# Patient Record
Sex: MALE | Race: WHITE | URBAN - METROPOLITAN AREA
[De-identification: names, ages, dates, MRNs, and addresses within clinical notes are randomized per-mention and may not be internally consistent; named-entity substitution may affect disease eponyms.]

---

## 2020-01-02 ENCOUNTER — APPOINTMENT (OUTPATIENT)
Dept: URBAN - METROPOLITAN AREA SURGERY 18 | Age: 80
Setting detail: DERMATOLOGY
End: 2020-01-04

## 2020-01-02 VITALS — DIASTOLIC BLOOD PRESSURE: 77 MMHG | HEART RATE: 51 BPM | SYSTOLIC BLOOD PRESSURE: 129 MMHG

## 2020-01-02 DIAGNOSIS — D485 NEOPLASM OF UNCERTAIN BEHAVIOR OF SKIN: ICD-10-CM

## 2020-01-02 PROBLEM — D48.5 NEOPLASM OF UNCERTAIN BEHAVIOR OF SKIN: Status: ACTIVE | Noted: 2020-01-02

## 2020-01-02 PROCEDURE — 11102 TANGNTL BX SKIN SINGLE LES: CPT

## 2020-01-02 PROCEDURE — OTHER BIOPSY BY SHAVE METHOD: OTHER

## 2020-01-02 PROCEDURE — OTHER PATIENT SPECIFIC COUNSELING: OTHER

## 2020-01-02 ASSESSMENT — LOCATION DETAILED DESCRIPTION DERM: LOCATION DETAILED: RIGHT POSTERIOR PARIETAL SCALP

## 2020-01-02 ASSESSMENT — LOCATION SIMPLE DESCRIPTION DERM: LOCATION SIMPLE: POSTERIOR SCALP

## 2020-01-02 ASSESSMENT — LOCATION ZONE DERM: LOCATION ZONE: SCALP

## 2020-01-02 NOTE — PROCEDURE: BIOPSY BY SHAVE METHOD
Path Notes (To The Dermatopathologist): * Previously biopsied lesion (11/27/2019) (Specimen number: [id number]) - diagnosis of SEVERELY ATYPICAL INTRAEPIDERMAL\\nMELANOCYTIC PROLIFERATION (concern but diagnosis not sufficient for MIS) - rebiopsy to establish definitive diagnosis. \\n\\n* Please review old slides (+/- tissue) from previous biopsy (lab information for previous biopsy sent along with new/repeat biopsy)\\n\\n** Patient had biopsy of separate lesion (located at least 2.5 cm from lesion above) on 12/04/2019 which returned diagnosis of 0.2 mm depth invasive melanoma. Path Notes (To The Dermatopathologist): * Previously biopsied lesion (11/27/2019) (Specimen number: [id number]) - diagnosis of SEVERELY ATYPICAL INTRAEPIDERMAL\\nMELANOCYTIC PROLIFERATION (concern but diagnosis not sufficient for MIS) - rebiopsy to establish definitive diagnosis. \\n\\n* Please review old slides (+/- tissue) from previous biopsy (lab information for previous biopsy sent along with new/repeat biopsy)\\n\\n** Patient had biopsy of separate lesion (located at least 2.5 cm from lesion above) on 12/04/2019 which returned diagnosis of 0.2 mm depth invasive melanoma.

## 2020-01-02 NOTE — PROCEDURE: PATIENT SPECIFIC COUNSELING
Detail Level: Zone
* Prior to the biopsy we discussed both diagnoses at length, as well as the prognosis of each lesion and options for management. \\n\\n* We discussed the indeterminate nature of the \"severely atypical melanocytic process\" the possible definitive diagnoses - benign (severely atypical junctional nevus +/- association with adjacent invasive melanoma), malignant (melanoma in situ - either additional primary or associated with adjacent invasive melanoma as one larger lesion). \\n\\n* We discussed the importance of attempting to establish definitive diagnosis in order to most precisely prognosticate and determine the most appropriate course of treatment (WLE with narrow margin vs 0.5 cm vs 1.0 cm margins or margin controlled therapy). The patient verbalized understanding and agreement with plan. He understands the limitations of additional biopsy (prior biopsy may have removed most diagnostic portion of lesion), and understands the rationale behind additional dermatopathology review of previous biopsy. All questions and concerns were addressed. \\n\\n* He will return next week for Mohs surgery for treatment of the invasive melanoma on the scalp. He understands we may end up treating the AIMP as the surgery for the invasive MM may run into the adjacent AIMP given the lesions' close proximity to one another.

## 2020-01-17 ENCOUNTER — APPOINTMENT (OUTPATIENT)
Dept: URBAN - METROPOLITAN AREA SURGERY 18 | Age: 80
Setting detail: DERMATOLOGY
End: 2020-01-18

## 2020-01-17 VITALS — HEART RATE: 66 BPM | DIASTOLIC BLOOD PRESSURE: 65 MMHG | SYSTOLIC BLOOD PRESSURE: 125 MMHG

## 2020-01-17 PROBLEM — C43.4 MALIGNANT MELANOMA OF SCALP AND NECK: Status: ACTIVE | Noted: 2020-01-17

## 2020-01-17 PROBLEM — D03.4 MELANOMA IN SITU OF SCALP AND NECK: Status: ACTIVE | Noted: 2020-01-17

## 2020-01-17 PROCEDURE — 13121 CMPLX RPR S/A/L 2.6-7.5 CM: CPT | Mod: 59

## 2020-01-17 PROCEDURE — 15275 SKIN SUB GRAFT FACE/NK/HF/G: CPT

## 2020-01-17 PROCEDURE — 17311 MOHS 1 STAGE H/N/HF/G: CPT | Mod: 76

## 2020-01-17 PROCEDURE — 17311 MOHS 1 STAGE H/N/HF/G: CPT

## 2020-01-17 PROCEDURE — 17312 MOHS ADDL STAGE: CPT

## 2020-01-17 PROCEDURE — 88342 IMHCHEM/IMCYTCHM 1ST ANTB: CPT | Mod: 59

## 2020-01-17 PROCEDURE — 17315 MOHS SURG ADDL BLOCK: CPT

## 2020-01-17 PROCEDURE — OTHER COUNSELING: OTHER

## 2020-01-17 PROCEDURE — OTHER ADDITIONAL NOTES: OTHER

## 2020-01-17 PROCEDURE — OTHER REFERRAL CORRESPONDENCE: OTHER

## 2020-01-17 PROCEDURE — OTHER MOHS SURGERY WITH DEBULK SPECIMEN: OTHER

## 2020-01-17 PROCEDURE — OTHER PATIENT SPECIFIC COUNSELING: OTHER

## 2020-01-17 NOTE — PROCEDURE: PATIENT SPECIFIC COUNSELING
Detail Level: Zone
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 Given their proximity and possibility of tumor transection/transposition during surgical reconstruction it was felt that treatment of his MIS at today's visit would be prudent. The patient verbalized understanding and agreement with the plan. \\n\\n* Reviewed options for reconstruction in detail (large scalp flap - i.e pinwheel flap or double O to Z flaps) vs complex closure with xenografting and possible delayed FTSG in 4 weeks. Risks, benefits, possible outcomes of each discussed in detail - complex closure with xenografting and delayed graft was selected for repair.
* Reviewed the diagnostic clarification that occurred with additional pathology review, \"severely atypical intraepidermal melanocytic proliferation\" to melanoma in situ. Reviewed treatment options in detail (see procedure above) as well as timeline for treatment (today vs treatment at separate appointment). Patient verbalized understanding and preference for margin controlled surgery today.

## 2020-01-17 NOTE — PROCEDURE: ADDITIONAL NOTES
Detail Level: Simple
Additional Notes: * Please note complex closure was performed along both defects for a total length of approximately 2.6 cm, the remaining defect(s) were addressed with xenografting (see procedure note). One xenograft (one EZ Derm package) was used and fashioned into two separate xenografts, one for the remaining defect of each surgical site, respectively. (* Patient only billed for one complex closure and one xenograft)

## 2020-01-17 NOTE — PROCEDURE: MOHS SURGERY WITH DEBULK SPECIMEN
right upper arm Bi-Rhombic Flap Text: The defect edges were debeveled with a #15 scalpel blade.  Given the location of the defect and the proximity to free margins a bi-rhombic flap was deemed most appropriate.  Using a sterile surgical marker, an appropriate rhombic flap was drawn incorporating the defect. The area thus outlined was incised deep to adipose tissue with a #15 scalpel blade.  The skin margins were undermined to an appropriate distance in all directions utilizing iris scissors.

## 2020-01-17 NOTE — PROCEDURE: MOHS SURGERY WITH DEBULK SPECIMEN
Mohs Histo Method Verbiage: The epidermis and dermis were  from the fat as distinct tissue sections.  Each section was then chromacoded and processed in the Mohs lab using the Mohs protocol for melanocytic lesions and submitted for frozen section. The epidermal/dermal pieces were stained with H&E and additionally with an immunohistochemical stain (on separate slides). The fat was stained with H&E alone.

## 2020-01-18 ENCOUNTER — APPOINTMENT (OUTPATIENT)
Dept: URBAN - METROPOLITAN AREA SURGERY 18 | Age: 80
Setting detail: DERMATOLOGY
End: 2020-01-21

## 2020-01-18 DIAGNOSIS — Z48.817 ENCOUNTER FOR SURGICAL AFTERCARE FOLLOWING SURGERY ON THE SKIN AND SUBCUTANEOUS TISSUE: ICD-10-CM

## 2020-01-18 PROCEDURE — OTHER PRESCRIPTION: OTHER

## 2020-01-21 ENCOUNTER — APPOINTMENT (OUTPATIENT)
Dept: URBAN - METROPOLITAN AREA SURGERY 18 | Age: 80
Setting detail: DERMATOLOGY
End: 2020-01-22

## 2020-01-21 DIAGNOSIS — Z48.817 ENCOUNTER FOR SURGICAL AFTERCARE FOLLOWING SURGERY ON THE SKIN AND SUBCUTANEOUS TISSUE: ICD-10-CM

## 2020-01-21 PROCEDURE — 99024 POSTOP FOLLOW-UP VISIT: CPT

## 2020-01-21 PROCEDURE — OTHER POST-OP WOUND CHECK: OTHER

## 2020-01-21 ASSESSMENT — LOCATION DETAILED DESCRIPTION DERM: LOCATION DETAILED: MID-OCCIPITAL SCALP

## 2020-01-21 ASSESSMENT — LOCATION SIMPLE DESCRIPTION DERM: LOCATION SIMPLE: POSTERIOR SCALP

## 2020-01-21 ASSESSMENT — LOCATION ZONE DERM: LOCATION ZONE: SCALP

## 2020-01-21 NOTE — PROCEDURE: POST-OP WOUND CHECK
Add 64714 Cpt? (Important Note: In 2017 The Use Of 68177 Is Being Tracked By Cms To Determine Future Global Period Reimbursement For Global Periods): yes
Additional Comments: Pt presents to clinic 4 days post op MOHS for dressing change. Wound cleaned with normal saline and wound dressed appropriately. Pt to follow up in 3 days
Detail Level: Detailed
Wound Evaluated By: Dr. Jaycob Galindo

## 2020-01-24 ENCOUNTER — APPOINTMENT (OUTPATIENT)
Dept: URBAN - METROPOLITAN AREA SURGERY 18 | Age: 80
Setting detail: DERMATOLOGY
End: 2020-01-25

## 2020-01-24 DIAGNOSIS — Z48.817 ENCOUNTER FOR SURGICAL AFTERCARE FOLLOWING SURGERY ON THE SKIN AND SUBCUTANEOUS TISSUE: ICD-10-CM

## 2020-01-24 PROCEDURE — 99024 POSTOP FOLLOW-UP VISIT: CPT

## 2020-01-24 PROCEDURE — OTHER POST-OP WOUND CHECK: OTHER

## 2020-01-24 ASSESSMENT — LOCATION SIMPLE DESCRIPTION DERM: LOCATION SIMPLE: POSTERIOR SCALP

## 2020-01-24 ASSESSMENT — LOCATION ZONE DERM: LOCATION ZONE: SCALP

## 2020-01-24 ASSESSMENT — LOCATION DETAILED DESCRIPTION DERM: LOCATION DETAILED: MID-OCCIPITAL SCALP

## 2020-01-24 NOTE — PROCEDURE: POST-OP WOUND CHECK
Detail Level: Detailed
Wound Evaluated By: Dr. Jaycob Galindo
Add 31244 Cpt? (Important Note: In 2017 The Use Of 15283 Is Being Tracked By Cms To Determine Future Global Period Reimbursement For Global Periods): yes
Additional Comments: Pt presents 1 week post op MOHS surgery. Pt presents no concerns. Site was cleaned with normal saline and dressed appropriately\\n\\nDr. Mateo's note - seen/agree, healing well in first week postop, early granulation forming over xenograft. No signs/symptoms of poor wound healing or infection. Continued care reviewed, follow up as above.

## 2020-01-27 ENCOUNTER — APPOINTMENT (OUTPATIENT)
Dept: URBAN - METROPOLITAN AREA SURGERY 18 | Age: 80
Setting detail: DERMATOLOGY
End: 2020-01-28

## 2020-01-27 DIAGNOSIS — Z48.817 ENCOUNTER FOR SURGICAL AFTERCARE FOLLOWING SURGERY ON THE SKIN AND SUBCUTANEOUS TISSUE: ICD-10-CM

## 2020-01-27 PROCEDURE — OTHER POST-OP WOUND CHECK: OTHER

## 2020-01-27 ASSESSMENT — LOCATION ZONE DERM: LOCATION ZONE: SCALP

## 2020-01-27 ASSESSMENT — LOCATION DETAILED DESCRIPTION DERM: LOCATION DETAILED: POSTERIOR MID-PARIETAL SCALP

## 2020-01-27 ASSESSMENT — LOCATION SIMPLE DESCRIPTION DERM: LOCATION SIMPLE: POSTERIOR SCALP

## 2020-01-27 NOTE — PROCEDURE: POST-OP WOUND CHECK
Add 08515 Cpt? (Important Note: In 2017 The Use Of 91396 Is Being Tracked By Cms To Determine Future Global Period Reimbursement For Global Periods): no
Detail Level: Detailed

## 2020-01-28 ENCOUNTER — APPOINTMENT (OUTPATIENT)
Dept: URBAN - METROPOLITAN AREA SURGERY 18 | Age: 80
Setting detail: DERMATOLOGY
End: 2020-01-28

## 2020-01-28 ENCOUNTER — RX ONLY (RX ONLY)
Age: 80
End: 2020-01-28

## 2020-01-28 DIAGNOSIS — Z48.817 ENCOUNTER FOR SURGICAL AFTERCARE FOLLOWING SURGERY ON THE SKIN AND SUBCUTANEOUS TISSUE: ICD-10-CM

## 2020-01-28 PROCEDURE — OTHER POST-OP WOUND CHECK: OTHER

## 2020-01-28 PROCEDURE — 99024 POSTOP FOLLOW-UP VISIT: CPT

## 2020-01-28 PROCEDURE — OTHER ORDER TESTS: OTHER

## 2020-01-28 RX ORDER — MUPIROCIN 20 MG/G
OINTMENT TOPICAL TWICE DAILY
Qty: 1 | Refills: 3 | Status: ERX | COMMUNITY
Start: 2020-01-28

## 2020-01-28 ASSESSMENT — LOCATION ZONE DERM: LOCATION ZONE: SCALP

## 2020-01-28 ASSESSMENT — LOCATION DETAILED DESCRIPTION DERM: LOCATION DETAILED: MID-OCCIPITAL SCALP

## 2020-01-28 ASSESSMENT — LOCATION SIMPLE DESCRIPTION DERM: LOCATION SIMPLE: POSTERIOR SCALP

## 2020-01-28 NOTE — PROCEDURE: POST-OP WOUND CHECK
Add 48418 Cpt? (Important Note: In 2017 The Use Of 48581 Is Being Tracked By Cms To Determine Future Global Period Reimbursement For Global Periods): yes
Additional Comments: Dr. Galindo's note - surgical sites are nontender, minimally/appropriately inflamed, nonedematous, not actively draining. Surgical site on left semi-soft/semi-fluctant, small volume seropurulent fluid obtained with moderate lateral pressure during exam. Fluid sent for culture. Prophylactic antibiotics (keflex, mupirocin ointment). Signs/symptoms worsening cutaneous and systemic infection reviewed. Call and RTC with any change, otherwise follow up in 7-10 days.
Wound Evaluated By: Dr. Jaycob Galindo
Detail Level: Detailed

## 2020-02-06 ENCOUNTER — APPOINTMENT (OUTPATIENT)
Dept: URBAN - METROPOLITAN AREA SURGERY 18 | Age: 80
Setting detail: DERMATOLOGY
End: 2020-02-08

## 2020-02-06 DIAGNOSIS — Z48.817 ENCOUNTER FOR SURGICAL AFTERCARE FOLLOWING SURGERY ON THE SKIN AND SUBCUTANEOUS TISSUE: ICD-10-CM

## 2020-02-06 PROCEDURE — 99024 POSTOP FOLLOW-UP VISIT: CPT

## 2020-02-06 PROCEDURE — OTHER POST-OP WOUND CHECK: OTHER

## 2020-02-06 ASSESSMENT — LOCATION DETAILED DESCRIPTION DERM
LOCATION DETAILED: MID-OCCIPITAL SCALP
LOCATION DETAILED: RIGHT SUPERIOR PARIETAL SCALP

## 2020-02-06 ASSESSMENT — LOCATION SIMPLE DESCRIPTION DERM
LOCATION SIMPLE: SCALP
LOCATION SIMPLE: POSTERIOR SCALP

## 2020-02-06 ASSESSMENT — LOCATION ZONE DERM: LOCATION ZONE: SCALP

## 2020-02-06 NOTE — PROCEDURE: POST-OP WOUND CHECK
Add 00579 Cpt? (Important Note: In 2017 The Use Of 89947 Is Being Tracked By Cms To Determine Future Global Period Reimbursement For Global Periods): yes
Detail Level: Detailed
Additional Comments: Follow up in 3-4 weeks. Wound was debrided at time of visit. Continue to cleanse area daily and apply healing ointment.
Wound Evaluated By: Dr. Jaycob Galindo
Wound Evaluated By: Dr. Jaycob Galindo
Additional Comments: Follow up in 3-4 weeks. Continue to cleanse area daily and apply healing ointment.\\n\\nDr. Mateo's note - culture results reviewed, MSSA sensitive to cephalosporins. Clinically appears fully resolved. Patient reports no tenderness, drainage or other symptoms at either surgical site over the past week. Noninflamed/nondraining today. Xenograft crusted/firm overlying surgical site on scalp. Following sterile/clean preparation the residual xenograft was removed with sterile surgical scissors and pickups revealing healthy granulation tissue at base of wound. Continued care reviewed, expected appearance and timeline for granulation/reepithelialization reviewed.

## 2020-02-21 ENCOUNTER — APPOINTMENT (OUTPATIENT)
Dept: URBAN - METROPOLITAN AREA SURGERY 18 | Age: 80
Setting detail: DERMATOLOGY
End: 2020-02-25

## 2020-02-21 DIAGNOSIS — Z48.817 ENCOUNTER FOR SURGICAL AFTERCARE FOLLOWING SURGERY ON THE SKIN AND SUBCUTANEOUS TISSUE: ICD-10-CM

## 2020-02-21 PROCEDURE — 99024 POSTOP FOLLOW-UP VISIT: CPT

## 2020-02-21 PROCEDURE — OTHER POST-OP WOUND CHECK: OTHER

## 2020-02-21 ASSESSMENT — LOCATION DETAILED DESCRIPTION DERM: LOCATION DETAILED: MID-OCCIPITAL SCALP

## 2020-02-21 ASSESSMENT — LOCATION SIMPLE DESCRIPTION DERM: LOCATION SIMPLE: POSTERIOR SCALP

## 2020-02-21 ASSESSMENT — LOCATION ZONE DERM: LOCATION ZONE: SCALP

## 2020-02-21 NOTE — PROCEDURE: POST-OP WOUND CHECK
Additional Comments: Continue daily dressing changes until fully healed. Follow up as scheduled (1-2 weeks)
Detail Level: Detailed
Wound Evaluated By: Dr. Jaycob Galindo
Add 60659 Cpt? (Important Note: In 2017 The Use Of 12531 Is Being Tracked By Cms To Determine Future Global Period Reimbursement For Global Periods): yes

## 2020-02-27 ENCOUNTER — APPOINTMENT (OUTPATIENT)
Dept: URBAN - METROPOLITAN AREA SURGERY 18 | Age: 80
Setting detail: DERMATOLOGY
End: 2020-02-28

## 2020-02-27 DIAGNOSIS — Z48.817 ENCOUNTER FOR SURGICAL AFTERCARE FOLLOWING SURGERY ON THE SKIN AND SUBCUTANEOUS TISSUE: ICD-10-CM

## 2020-02-27 PROCEDURE — 99024 POSTOP FOLLOW-UP VISIT: CPT

## 2020-02-27 PROCEDURE — OTHER POST-OP WOUND CHECK: OTHER

## 2020-02-27 ASSESSMENT — LOCATION SIMPLE DESCRIPTION DERM: LOCATION SIMPLE: POSTERIOR SCALP

## 2020-02-27 ASSESSMENT — LOCATION ZONE DERM: LOCATION ZONE: SCALP

## 2020-02-27 ASSESSMENT — LOCATION DETAILED DESCRIPTION DERM: LOCATION DETAILED: MID-OCCIPITAL SCALP

## 2020-02-27 NOTE — PROCEDURE: POST-OP WOUND CHECK
Additional Comments: Follow up in four weeks. Continue thin layer of Vaseline daily.
Wound Evaluated By: Dr. Jaycob Galindo
Detail Level: Detailed
Add 40981 Cpt? (Important Note: In 2017 The Use Of 23973 Is Being Tracked By Cms To Determine Future Global Period Reimbursement For Global Periods): yes

## 2020-06-03 ENCOUNTER — APPOINTMENT (OUTPATIENT)
Dept: URBAN - METROPOLITAN AREA SURGERY 18 | Age: 80
Setting detail: DERMATOLOGY
End: 2020-06-04

## 2020-06-03 VITALS — HEART RATE: 75 BPM | RESPIRATION RATE: 16 BRPM | DIASTOLIC BLOOD PRESSURE: 90 MMHG | SYSTOLIC BLOOD PRESSURE: 135 MMHG

## 2020-06-03 DIAGNOSIS — D485 NEOPLASM OF UNCERTAIN BEHAVIOR OF SKIN: ICD-10-CM

## 2020-06-03 PROBLEM — D48.5 NEOPLASM OF UNCERTAIN BEHAVIOR OF SKIN: Status: ACTIVE | Noted: 2020-06-03

## 2020-06-03 PROCEDURE — 11423 EXC H-F-NK-SP B9+MARG 2.1-3: CPT

## 2020-06-03 PROCEDURE — 13121 CMPLX RPR S/A/L 2.6-7.5 CM: CPT

## 2020-06-03 PROCEDURE — OTHER REFERRAL CORRESPONDENCE: OTHER

## 2020-06-03 PROCEDURE — OTHER EXCISION: OTHER

## 2020-06-03 ASSESSMENT — LOCATION SIMPLE DESCRIPTION DERM: LOCATION SIMPLE: SCALP

## 2020-06-03 ASSESSMENT — LOCATION ZONE DERM: LOCATION ZONE: SCALP

## 2020-06-03 ASSESSMENT — LOCATION DETAILED DESCRIPTION DERM: LOCATION DETAILED: LEFT SUPERIOR PARIETAL SCALP

## 2020-06-03 NOTE — PROCEDURE: EXCISION
Complex Repair Preamble Text (Leave Blank If You Do Not Want): The resulting defect was undermined widely and bilaterally in the appropriate surgical plane taking care to preserve local arteries, veins, nerves, and other structures of anatomic importance. Hemostasis was achieved and then the wound was sutured together in layered fashion.

## 2020-06-03 NOTE — PROCEDURE: EXCISION
Path Notes (To The Dermatopathologist): * Previous pathology report indicates: \"severely atypical intraepidermal melanocytic proliferation\" with comment \"findings are not sufficient for diagnosis of MIS\", but the lesion is \"likely a precursor lesion.\" \\n\\nPlease check margins.

## 2020-06-03 NOTE — PROCEDURE: EXCISION
Eliptical Excision Additional Text (Leave Blank If You Do Not Want): The risks, benefits, and possible outcomes of this procedure were discussed along with the risks, benefits, and possible outcomes of other options for both treatment and wound repair (including but not limited to: variations of complex closure, flaps, grafts, and second intention). The patient verbalized understanding and consent to the outlined procedure. The patient understands should the surgical site heal in a manner they find unsatisfactory further interventions or referral to an additional specialist may be required.\\n\\nThe margin was drawn around the clinically apparent lesion.  An elliptical shape was then drawn on the skin incorporating the lesion and margins.  The patient was prepped and draped in sterile fashion, anesthesia was checked and supplemented as necessary. Incisions were then made along the previously demarcated lines to the appropriate tissue plane and the lesion was extirpated.

## 2020-06-05 ENCOUNTER — APPOINTMENT (OUTPATIENT)
Dept: URBAN - METROPOLITAN AREA SURGERY 18 | Age: 80
Setting detail: DERMATOLOGY
End: 2020-06-10

## 2020-06-05 DIAGNOSIS — Z48.817 ENCOUNTER FOR SURGICAL AFTERCARE FOLLOWING SURGERY ON THE SKIN AND SUBCUTANEOUS TISSUE: ICD-10-CM

## 2020-06-05 PROCEDURE — 99024 POSTOP FOLLOW-UP VISIT: CPT

## 2020-06-05 PROCEDURE — OTHER POST-OP WOUND CHECK: OTHER

## 2020-06-05 ASSESSMENT — LOCATION DETAILED DESCRIPTION DERM: LOCATION DETAILED: LEFT SUPERIOR PARIETAL SCALP

## 2020-06-05 ASSESSMENT — LOCATION SIMPLE DESCRIPTION DERM: LOCATION SIMPLE: SCALP

## 2020-06-05 ASSESSMENT — LOCATION ZONE DERM: LOCATION ZONE: SCALP

## 2020-06-05 NOTE — PROCEDURE: POST-OP WOUND CHECK
Additional Comments: Staples clean, dry and intact. Site dressed appropriately
Wound Evaluated By: Dr. Jaycob Galindo
Add 77719 Cpt? (Important Note: In 2017 The Use Of 02684 Is Being Tracked By Cms To Determine Future Global Period Reimbursement For Global Periods): yes
Detail Level: Detailed

## 2020-06-05 NOTE — PROCEDURE: MOHS SURGERY WITH DEBULK SPECIMEN
Posterior Auricular Interpolation Flap Text: A decision was made to reconstruct the defect utilizing an interpolation axial flap and a staged reconstruction.  A telfa template was made of the defect.  This telfa template was then used to outline the posterior auricular interpolation flap.  The donor area for the pedicle flap was then injected with anesthesia.  The flap was excised through the skin and subcutaneous tissue down to the layer of the underlying musculature.  The pedicle flap was carefully excised within this deep plane to maintain its blood supply.  The edges of the donor site were undermined.   The donor site was closed in a primary fashion.  The pedicle was then rotated into position and sutured.  Once the tube was sutured into place, adequate blood supply was confirmed with blanching and refill.  The pedicle was then wrapped with xeroform gauze and dressed appropriately with a telfa and gauze bandage to ensure continued blood supply and protect the attached pedicle. Suturegard Intro: Intraoperative tissue expansion was performed, utilizing the SUTUREGARD device, in order to reduce wound tension.

## 2020-06-16 ENCOUNTER — APPOINTMENT (OUTPATIENT)
Dept: URBAN - METROPOLITAN AREA SURGERY 18 | Age: 80
Setting detail: DERMATOLOGY
End: 2020-06-18

## 2020-06-16 DIAGNOSIS — Z48.817 ENCOUNTER FOR SURGICAL AFTERCARE FOLLOWING SURGERY ON THE SKIN AND SUBCUTANEOUS TISSUE: ICD-10-CM

## 2020-06-16 PROCEDURE — OTHER POST-OP WOUND CHECK: OTHER

## 2020-06-16 PROCEDURE — 99024 POSTOP FOLLOW-UP VISIT: CPT

## 2020-06-16 ASSESSMENT — LOCATION ZONE DERM: LOCATION ZONE: SCALP

## 2020-06-16 ASSESSMENT — LOCATION DETAILED DESCRIPTION DERM: LOCATION DETAILED: LEFT SUPERIOR PARIETAL SCALP

## 2020-06-16 ASSESSMENT — LOCATION SIMPLE DESCRIPTION DERM: LOCATION SIMPLE: SCALP

## 2020-06-16 NOTE — PROCEDURE: POST-OP WOUND CHECK
Add 41847 Cpt? (Important Note: In 2017 The Use Of 18132 Is Being Tracked By Cms To Determine Future Global Period Reimbursement For Global Periods): yes
Wound Evaluated By: Dr. Jaycob Galindo
Additional Comments: Continue to cleanse area daily and apply healing ointment until suture line completely healed (likely 10-14 days). Follow up PRN.
Detail Level: Detailed

## 2020-10-12 NOTE — PROCEDURE: REFERRAL CORRESPONDENCE
Problem: At Risk for Falls  Goal: # Patient does not fall  Outcome: Outcome Met, Continue evaluating goal progress toward completion  Goal: # Takes action to control fall-related risks  Outcome: Outcome Met, Continue evaluating goal progress toward completion     
Include Body Diagram: yes
Include Ccd: no
Letter Template: 5831

## 2023-01-01 NOTE — PROCEDURE: MOHS SURGERY WITH DEBULK SPECIMEN
unknown Same Histology In Subsequent Stages Text: The pattern and morphology of the tumor is as described in the first stage.